# Patient Record
Sex: MALE | Race: WHITE | NOT HISPANIC OR LATINO | URBAN - METROPOLITAN AREA
[De-identification: names, ages, dates, MRNs, and addresses within clinical notes are randomized per-mention and may not be internally consistent; named-entity substitution may affect disease eponyms.]

---

## 2020-09-22 ENCOUNTER — INPATIENT (INPATIENT)
Facility: HOSPITAL | Age: 60
LOS: 0 days | Discharge: HOME | DRG: 309 | End: 2020-09-23
Attending: FAMILY MEDICINE | Admitting: STUDENT IN AN ORGANIZED HEALTH CARE EDUCATION/TRAINING PROGRAM
Payer: COMMERCIAL

## 2020-09-22 VITALS
TEMPERATURE: 99 F | WEIGHT: 197.09 LBS | SYSTOLIC BLOOD PRESSURE: 111 MMHG | HEIGHT: 70 IN | RESPIRATION RATE: 16 BRPM | HEART RATE: 84 BPM | OXYGEN SATURATION: 100 % | DIASTOLIC BLOOD PRESSURE: 61 MMHG

## 2020-09-22 DIAGNOSIS — Z29.9 ENCOUNTER FOR PROPHYLACTIC MEASURES, UNSPECIFIED: ICD-10-CM

## 2020-09-22 DIAGNOSIS — Z98.89 OTHER SPECIFIED POSTPROCEDURAL STATES: Chronic | ICD-10-CM

## 2020-09-22 DIAGNOSIS — N17.9 ACUTE KIDNEY FAILURE, UNSPECIFIED: ICD-10-CM

## 2020-09-22 DIAGNOSIS — I48.91 UNSPECIFIED ATRIAL FIBRILLATION: ICD-10-CM

## 2020-09-22 DIAGNOSIS — I10 ESSENTIAL (PRIMARY) HYPERTENSION: ICD-10-CM

## 2020-09-22 DIAGNOSIS — Z98.890 OTHER SPECIFIED POSTPROCEDURAL STATES: Chronic | ICD-10-CM

## 2020-09-22 LAB
ALBUMIN SERPL ELPH-MCNC: 3.8 G/DL — SIGNIFICANT CHANGE UP (ref 3.3–5)
ALP SERPL-CCNC: 61 U/L — SIGNIFICANT CHANGE UP (ref 40–120)
ALT FLD-CCNC: 39 U/L — SIGNIFICANT CHANGE UP (ref 12–78)
ANION GAP SERPL CALC-SCNC: 9 MMOL/L — SIGNIFICANT CHANGE UP (ref 5–17)
APTT BLD: 28.3 SEC — SIGNIFICANT CHANGE UP (ref 27.5–35.5)
AST SERPL-CCNC: 27 U/L — SIGNIFICANT CHANGE UP (ref 15–37)
BASOPHILS # BLD AUTO: 0.06 K/UL — SIGNIFICANT CHANGE UP (ref 0–0.2)
BASOPHILS NFR BLD AUTO: 0.5 % — SIGNIFICANT CHANGE UP (ref 0–2)
BILIRUB SERPL-MCNC: 0.5 MG/DL — SIGNIFICANT CHANGE UP (ref 0.2–1.2)
BUN SERPL-MCNC: 26 MG/DL — HIGH (ref 7–23)
CALCIUM SERPL-MCNC: 8.6 MG/DL — SIGNIFICANT CHANGE UP (ref 8.5–10.1)
CHLORIDE SERPL-SCNC: 109 MMOL/L — HIGH (ref 96–108)
CK MB BLD-MCNC: 1.4 % — SIGNIFICANT CHANGE UP (ref 0–3.5)
CK MB CFR SERPL CALC: 3.9 NG/ML — HIGH (ref 0–3.6)
CK SERPL-CCNC: 288 U/L — SIGNIFICANT CHANGE UP (ref 26–308)
CO2 SERPL-SCNC: 22 MMOL/L — SIGNIFICANT CHANGE UP (ref 22–31)
CREAT SERPL-MCNC: 1.4 MG/DL — HIGH (ref 0.5–1.3)
EOSINOPHIL # BLD AUTO: 0.18 K/UL — SIGNIFICANT CHANGE UP (ref 0–0.5)
EOSINOPHIL NFR BLD AUTO: 1.4 % — SIGNIFICANT CHANGE UP (ref 0–6)
GLUCOSE SERPL-MCNC: 118 MG/DL — HIGH (ref 70–99)
HCT VFR BLD CALC: 42.4 % — SIGNIFICANT CHANGE UP (ref 39–50)
HGB BLD-MCNC: 15.6 G/DL — SIGNIFICANT CHANGE UP (ref 13–17)
IMM GRANULOCYTES NFR BLD AUTO: 0.6 % — SIGNIFICANT CHANGE UP (ref 0–1.5)
INR BLD: 1 RATIO — SIGNIFICANT CHANGE UP (ref 0.88–1.16)
LYMPHOCYTES # BLD AUTO: 1.9 K/UL — SIGNIFICANT CHANGE UP (ref 1–3.3)
LYMPHOCYTES # BLD AUTO: 14.7 % — SIGNIFICANT CHANGE UP (ref 13–44)
MCHC RBC-ENTMCNC: 33.3 PG — SIGNIFICANT CHANGE UP (ref 27–34)
MCHC RBC-ENTMCNC: 36.8 GM/DL — HIGH (ref 32–36)
MCV RBC AUTO: 90.6 FL — SIGNIFICANT CHANGE UP (ref 80–100)
MONOCYTES # BLD AUTO: 0.79 K/UL — SIGNIFICANT CHANGE UP (ref 0–0.9)
MONOCYTES NFR BLD AUTO: 6.1 % — SIGNIFICANT CHANGE UP (ref 2–14)
NEUTROPHILS # BLD AUTO: 9.92 K/UL — HIGH (ref 1.8–7.4)
NEUTROPHILS NFR BLD AUTO: 76.7 % — SIGNIFICANT CHANGE UP (ref 43–77)
NRBC # BLD: 0 /100 WBCS — SIGNIFICANT CHANGE UP (ref 0–0)
NT-PROBNP SERPL-SCNC: 41 PG/ML — SIGNIFICANT CHANGE UP (ref 0–125)
PLATELET # BLD AUTO: 193 K/UL — SIGNIFICANT CHANGE UP (ref 150–400)
POTASSIUM SERPL-MCNC: 4.1 MMOL/L — SIGNIFICANT CHANGE UP (ref 3.5–5.3)
POTASSIUM SERPL-SCNC: 4.1 MMOL/L — SIGNIFICANT CHANGE UP (ref 3.5–5.3)
PROT SERPL-MCNC: 7.1 G/DL — SIGNIFICANT CHANGE UP (ref 6–8.3)
PROTHROM AB SERPL-ACNC: 11.7 SEC — SIGNIFICANT CHANGE UP (ref 10.6–13.6)
RBC # BLD: 4.68 M/UL — SIGNIFICANT CHANGE UP (ref 4.2–5.8)
RBC # FLD: 12.2 % — SIGNIFICANT CHANGE UP (ref 10.3–14.5)
SODIUM SERPL-SCNC: 140 MMOL/L — SIGNIFICANT CHANGE UP (ref 135–145)
TROPONIN I SERPL-MCNC: <.015 NG/ML — SIGNIFICANT CHANGE UP (ref 0.01–0.04)
TSH SERPL-MCNC: 1.57 UIU/ML — SIGNIFICANT CHANGE UP (ref 0.36–3.74)
WBC # BLD: 12.93 K/UL — HIGH (ref 3.8–10.5)
WBC # FLD AUTO: 12.93 K/UL — HIGH (ref 3.8–10.5)

## 2020-09-22 PROCEDURE — 99291 CRITICAL CARE FIRST HOUR: CPT

## 2020-09-22 PROCEDURE — 99223 1ST HOSP IP/OBS HIGH 75: CPT | Mod: AI

## 2020-09-22 PROCEDURE — 93010 ELECTROCARDIOGRAM REPORT: CPT | Mod: 76

## 2020-09-22 PROCEDURE — 71045 X-RAY EXAM CHEST 1 VIEW: CPT | Mod: 26

## 2020-09-22 PROCEDURE — 93306 TTE W/DOPPLER COMPLETE: CPT | Mod: 26

## 2020-09-22 RX ORDER — LISINOPRIL 2.5 MG/1
1 TABLET ORAL
Qty: 0 | Refills: 0 | DISCHARGE

## 2020-09-22 RX ORDER — SODIUM CHLORIDE 9 MG/ML
1000 INJECTION INTRAMUSCULAR; INTRAVENOUS; SUBCUTANEOUS
Refills: 0 | Status: DISCONTINUED | OUTPATIENT
Start: 2020-09-22 | End: 2020-09-23

## 2020-09-22 RX ORDER — DILTIAZEM HCL 120 MG
10 CAPSULE, EXT RELEASE 24 HR ORAL ONCE
Refills: 0 | Status: COMPLETED | OUTPATIENT
Start: 2020-09-22 | End: 2020-09-22

## 2020-09-22 RX ORDER — DILTIAZEM HCL 120 MG
30 CAPSULE, EXT RELEASE 24 HR ORAL EVERY 6 HOURS
Refills: 0 | Status: DISCONTINUED | OUTPATIENT
Start: 2020-09-22 | End: 2020-09-23

## 2020-09-22 RX ORDER — DILTIAZEM HCL 120 MG
20 CAPSULE, EXT RELEASE 24 HR ORAL ONCE
Refills: 0 | Status: COMPLETED | OUTPATIENT
Start: 2020-09-22 | End: 2020-09-22

## 2020-09-22 RX ORDER — SODIUM CHLORIDE 9 MG/ML
1000 INJECTION INTRAMUSCULAR; INTRAVENOUS; SUBCUTANEOUS ONCE
Refills: 0 | Status: COMPLETED | OUTPATIENT
Start: 2020-09-22 | End: 2020-09-22

## 2020-09-22 RX ORDER — METOPROLOL TARTRATE 50 MG
5 TABLET ORAL ONCE
Refills: 0 | Status: COMPLETED | OUTPATIENT
Start: 2020-09-22 | End: 2020-09-22

## 2020-09-22 RX ORDER — DILTIAZEM HCL 120 MG
30 CAPSULE, EXT RELEASE 24 HR ORAL ONCE
Refills: 0 | Status: COMPLETED | OUTPATIENT
Start: 2020-09-22 | End: 2020-09-22

## 2020-09-22 RX ORDER — ENOXAPARIN SODIUM 100 MG/ML
90 INJECTION SUBCUTANEOUS
Refills: 0 | Status: DISCONTINUED | OUTPATIENT
Start: 2020-09-22 | End: 2020-09-23

## 2020-09-22 RX ADMIN — Medication 5 MILLIGRAM(S): at 21:40

## 2020-09-22 RX ADMIN — SODIUM CHLORIDE 1000 MILLILITER(S): 9 INJECTION INTRAMUSCULAR; INTRAVENOUS; SUBCUTANEOUS at 12:07

## 2020-09-22 RX ADMIN — Medication 10 MILLIGRAM(S): at 21:45

## 2020-09-22 RX ADMIN — Medication 20 MILLIGRAM(S): at 21:20

## 2020-09-22 RX ADMIN — Medication 30 MILLIGRAM(S): at 22:14

## 2020-09-22 NOTE — H&P ADULT - NSHPSOCIALHISTORY_GEN_ALL_CORE
from home  works in sales  drinks 3 drinks daily friday-sunday. No etoh M-Th. Last drink Sunday  Rare tobacco as teenager , no active smoking  Rare Marijuana usage - approx 6x a year   Denies supplement use   Drinks 2-3 cups of coffee a day  [facility] independent

## 2020-09-22 NOTE — ED PROVIDER NOTE - PROGRESS NOTE DETAILS
Paged placed out to cardiology for consult Spoke to hospitalist, Dr. Diop, accepted admission. Pt with new onset afib with RVR, given cardizem 20 IV upon arrival since hr in 170s, no improvement, given another 10 IV cardizem, improved to 120s, then given cardizem 30 po and lopressor 5mg IV, HR improved to 90s. will monitor.  Paged placed out to cardiology for consult

## 2020-09-22 NOTE — ED PROVIDER NOTE - ATTENDING CONTRIBUTION TO CARE
60yo M ho htn on lisinopirl pw palpitations, lightheadedness, dizziness, near syncope and chest pain. pt was playing baseball and suddenly started ot have this sensation. on arrival pt in rapid afib to 170s-180s, with normal BP  pt given 20IV  cardizme without effect, another 10 given, HR came down to 120s but still rapid, pt then given 30mg cardizem PO and 5 IV lorpressor with improvement to the 90s,   will check labs, trop, tsh, cards consult  chads vasc 1

## 2020-09-22 NOTE — H&P ADULT - NSICDXPASTMEDICALHX_GEN_ALL_CORE_FT
PAST MEDICAL HISTORY:  Heartburn occasional    Herpes simplex infection     Hypertension     Inguinal hernia

## 2020-09-22 NOTE — H&P ADULT - PROBLEM SELECTOR PLAN 3
- likely 2/2 hypovolemia , hypovolemia also likely contributed to new onset afib.   - s/p 1L NS in ED, c/w 100cc/hr x 12 hours  - repeat am bmp  - hold lisinopril  - avoid nephrotoxic meds

## 2020-09-22 NOTE — ED PROVIDER NOTE - CLINICAL SUMMARY MEDICAL DECISION MAKING FREE TEXT BOX
60 yo M with hx of HTN while playing baseball tonight around 7:30pm developed sudden onset palpitations, dizziness, near syncopal with chest discomfort, upon arrival in ED, hr 170s, /75, ekg shows afib with rvr, pt with new onset afib, no sob/abd pain/n/v/ha/smoking, will give cardizem IV, IVF, cardiac enzymes, labs, monitor, cardiology consult, admit

## 2020-09-22 NOTE — ED PROVIDER NOTE - MUSCULOSKELETAL, MLM
Spine appears normal, range of motion is not limited, no muscle or joint tenderness, calf NT B, no edema BLE

## 2020-09-22 NOTE — H&P ADULT - HISTORY OF PRESENT ILLNESS
58 yo m pmh htn presents with palpitations and lightheadedness found to be in new onset afib. Pt states he was in his normal states of health, then this afternoon he was playing in a softball game- he was pitching and suddenly developed dizziness like the room was spining around him and felt his heart racing with some associated chest tightness. Rosa was present at game and noted he became diaphoretic and short of breath. He left the game and [facility] to check his HR and blood pressure. BP was 90/60 and HR was jumping all over on the monitor but remained tachycardic. They were concerned how long ems would take so they drove directly to the hospital. At present pt denies cp, palpitations, shortness of breath, dizziness, nausea, vomiting .   In the ED pt found to be in rapid afib. CBC, CMP, TSH, coags, cardiac enzymes performed. Significant for wbc 12.93, cr 1.4, bun  26. TSH WNL.  Pt was given 1 L NS, cardizem 20 mg IVP x 1, 10mg IVP x 1, Cardizem 30mg po x 1, Lopressor 5mg IVP x 1. Pt currently with -120bpm.

## 2020-09-22 NOTE — ED ADULT NURSE NOTE - OBJECTIVE STATEMENT
received pt via triage with c/o diziness while playing softball pt with chest pain evaluated and ekg done and reviewed and placed on cardiac monitor hr 174 to 180 MD at bedside and pt medicated with cardizem 20 mg ivp with no result  and pt medicated withanothe cardizem 10 mg ivp  and  but decrease in pain with scale 4 and pt medicated with cardizem 10 mg po  and metoprolol 5mg ivp with good result of HR of 81 to 90 pt denies any pain at present repeat EKG done and reviewed vital signs stable and xray done pt remained on cardiac monoitor for further evaluation

## 2020-09-22 NOTE — H&P ADULT - NSICDXFAMILYHX_GEN_ALL_CORE_FT
FAMILY HISTORY:  FH: myocardial infarction, Father- age 80's    Father  Still living? Yes, Estimated age: 81-90  Family history of melanoma, Age at diagnosis: Age Unknown    Mother  Still living? Yes, Estimated age: Age Unknown  Family history of breast cancer, Age at diagnosis: Age Unknown

## 2020-09-22 NOTE — H&P ADULT - PROBLEM SELECTOR PLAN 1
-Pt presenting with new onset Afib with RVR, HR initially 150's  - now s/p cardizem IVP 20 x1, 10 x 1, cardizem 30mg po x 1 and lopressor 5mg IVP x 1. improved.  - c/w cardizem 30mg PO q 6 hours. BP borderline. Hold for sbp <100.   - TSH obtained in ED. WNL  - troponin neg x 1. f/u repeat cardiac enzymes  - Obtain echo   - am hemoglobin a1c to further risk stratify for long term A/c   - chadvasc 1 , start full dose lovenox. to discuss risks vs benefits of long term a/c with cardio  - am cbc, bmp, mg, phos  - consult cardio- [facility]  - monitor on telemetry

## 2020-09-22 NOTE — ED ADULT TRIAGE NOTE - CHIEF COMPLAINT QUOTE
"I was playing softball and I started having chest pain."  pt states he was dizzy and pre-syncopal and felt "like my heart was beating out of my chest."

## 2020-09-22 NOTE — H&P ADULT - NSHPPHYSICALEXAM_GEN_ALL_CORE
Vital Signs Last 24 Hrs  T(C): 36.7 (22 Sep 2020 22:30), Max: 37.2 (22 Sep 2020 21:10)  T(F): 98 (22 Sep 2020 22:30), Max: 98.9 (22 Sep 2020 21:10)  HR: 110 (22 Sep 2020 22:30) (84 - 174)  BP: 98/56 (22 Sep 2020 22:30) (95/58 - 127/75)  BP(mean): --  RR: 16 (22 Sep 2020 22:30) (16 - 16)  SpO2: 98% (22 Sep 2020 22:30) (98% - 100%)    General: Well developed, well nourished, NAD  HEENT: NCAT, PERRLA, EOMI bl, moist mucous membranes   Neck: Supple, nontender, no mass  Neurology: A&Ox3, nonfocal  Respiratory: CTA B/L, No W/R/R  CV: tachycardic, +S1/S2, no murmurs, rubs or gallops  Abdominal: Soft, NT, ND +BSx4  Extremities: No C/C/E, + peripheral pulses  Skin: warm, dry, normal color, no rash

## 2020-09-22 NOTE — H&P ADULT - NSHPLABSRESULTS_GEN_ALL_CORE
15.6   12.93 )-----------( 193      ( 22 Sep 2020 21:40 )             42.4     22 Sep 2020 21:40    140    |  109    |  26     ----------------------------<  118    4.1     |  22     |  1.40     Ca    8.6        22 Sep 2020 21:40    TPro  7.1    /  Alb  3.8    /  TBili  0.5    /  DBili  x      /  AST  27     /  ALT  39     /  AlkPhos  61     22 Sep 2020 21:40    LIVER FUNCTIONS - ( 22 Sep 2020 21:40 )  Alb: 3.8 g/dL / Pro: 7.1 g/dL / ALK PHOS: 61 U/L / ALT: 39 U/L / AST: 27 U/L / GGT: x           PT/INR - ( 22 Sep 2020 21:44 )   PT: 11.7 sec;   INR: 1.00 ratio         PTT - ( 22 Sep 2020 21:44 )  PTT:28.3 sec  CAPILLARY BLOOD GLUCOSE        CARDIAC MARKERS ( 22 Sep 2020 21:40 )  <.015 ng/mL / x     / 288 U/L / x     / 3.9 ng/mL

## 2020-09-22 NOTE — H&P ADULT - NSHPREVIEWOFSYSTEMS_GEN_ALL_CORE
Constitutional: denies fever, chills, general malaise  HEENT: denies dry mouth, sore throat, blurry vision, admits dizziness  Respiratory: denies SOB, ROBLES, cough, sputum production, wheezing (fiance noted dyspnea at time of event)  Cardiovascular: denies CP,  edema, admits palpitations - resolved   Gastrointestinal: denies nausea, vomiting, diarrhea  Genitourinary: denies dysuria, frequency, urgency  Skin/Breast: denies rash  Musculoskeletal: denies myalgias, arthritis, joint swelling, muscle weakness  Neurologic: denies syncope, LOC, headache, weakness, admits dizziness  ROS negative except as noted above

## 2020-09-22 NOTE — ED PROVIDER NOTE - OBJECTIVE STATEMENT
58 y/o M with hx of HTN, heartburn presents with c/o palpitations, dizziness, chest discomfort and near syncope today. Pt states that he was playing baseball when he suddenly started to feel these symptoms around 7:30pm tonight and then came to ED for evaluation. Denies fever, cough, headache, vision changes, numbness, tingling, syncope, abdominal pain, N/V, SOB, smoking. Does not have a cardiologist.   pcp: Dr. Dash Tse

## 2020-09-23 ENCOUNTER — TRANSCRIPTION ENCOUNTER (OUTPATIENT)
Age: 60
End: 2020-09-23

## 2020-09-23 VITALS
HEART RATE: 67 BPM | RESPIRATION RATE: 18 BRPM | TEMPERATURE: 98 F | SYSTOLIC BLOOD PRESSURE: 110 MMHG | OXYGEN SATURATION: 95 % | DIASTOLIC BLOOD PRESSURE: 72 MMHG

## 2020-09-23 LAB
A1C WITH ESTIMATED AVERAGE GLUCOSE RESULT: 5 % — SIGNIFICANT CHANGE UP (ref 4–5.6)
ANION GAP SERPL CALC-SCNC: 7 MMOL/L — SIGNIFICANT CHANGE UP (ref 5–17)
BUN SERPL-MCNC: 19 MG/DL — SIGNIFICANT CHANGE UP (ref 7–23)
CALCIUM SERPL-MCNC: 8.1 MG/DL — LOW (ref 8.5–10.1)
CHLORIDE SERPL-SCNC: 114 MMOL/L — HIGH (ref 96–108)
CK MB BLD-MCNC: 1.3 % — SIGNIFICANT CHANGE UP (ref 0–3.5)
CK MB CFR SERPL CALC: 3.6 NG/ML — SIGNIFICANT CHANGE UP (ref 0–3.6)
CK SERPL-CCNC: 276 U/L — SIGNIFICANT CHANGE UP (ref 26–308)
CO2 SERPL-SCNC: 23 MMOL/L — SIGNIFICANT CHANGE UP (ref 22–31)
CREAT SERPL-MCNC: 0.99 MG/DL — SIGNIFICANT CHANGE UP (ref 0.5–1.3)
ESTIMATED AVERAGE GLUCOSE: 97 MG/DL — SIGNIFICANT CHANGE UP (ref 68–114)
GLUCOSE SERPL-MCNC: 94 MG/DL — SIGNIFICANT CHANGE UP (ref 70–99)
HCT VFR BLD CALC: 42.1 % — SIGNIFICANT CHANGE UP (ref 39–50)
HCV AB S/CO SERPL IA: 0.42 S/CO — SIGNIFICANT CHANGE UP (ref 0–0.99)
HCV AB SERPL-IMP: SIGNIFICANT CHANGE UP
HGB BLD-MCNC: 14.9 G/DL — SIGNIFICANT CHANGE UP (ref 13–17)
MAGNESIUM SERPL-MCNC: 2.3 MG/DL — SIGNIFICANT CHANGE UP (ref 1.6–2.6)
MCHC RBC-ENTMCNC: 32.8 PG — SIGNIFICANT CHANGE UP (ref 27–34)
MCHC RBC-ENTMCNC: 35.4 GM/DL — SIGNIFICANT CHANGE UP (ref 32–36)
MCV RBC AUTO: 92.7 FL — SIGNIFICANT CHANGE UP (ref 80–100)
NRBC # BLD: 0 /100 WBCS — SIGNIFICANT CHANGE UP (ref 0–0)
PHOSPHATE SERPL-MCNC: 2.6 MG/DL — SIGNIFICANT CHANGE UP (ref 2.5–4.5)
PLATELET # BLD AUTO: 174 K/UL — SIGNIFICANT CHANGE UP (ref 150–400)
POTASSIUM SERPL-MCNC: 3.9 MMOL/L — SIGNIFICANT CHANGE UP (ref 3.5–5.3)
POTASSIUM SERPL-SCNC: 3.9 MMOL/L — SIGNIFICANT CHANGE UP (ref 3.5–5.3)
RBC # BLD: 4.54 M/UL — SIGNIFICANT CHANGE UP (ref 4.2–5.8)
RBC # FLD: 12.1 % — SIGNIFICANT CHANGE UP (ref 10.3–14.5)
SARS-COV-2 IGG SERPL QL IA: POSITIVE
SARS-COV-2 IGM SERPL IA-ACNC: 155 INDEX — HIGH
SARS-COV-2 RNA SPEC QL NAA+PROBE: SIGNIFICANT CHANGE UP
SODIUM SERPL-SCNC: 144 MMOL/L — SIGNIFICANT CHANGE UP (ref 135–145)
TROPONIN I SERPL-MCNC: <.015 NG/ML — SIGNIFICANT CHANGE UP (ref 0.01–0.04)
WBC # BLD: 8.26 K/UL — SIGNIFICANT CHANGE UP (ref 3.8–10.5)
WBC # FLD AUTO: 8.26 K/UL — SIGNIFICANT CHANGE UP (ref 3.8–10.5)

## 2020-09-23 PROCEDURE — 85610 PROTHROMBIN TIME: CPT

## 2020-09-23 PROCEDURE — 85730 THROMBOPLASTIN TIME PARTIAL: CPT

## 2020-09-23 PROCEDURE — 36415 COLL VENOUS BLD VENIPUNCTURE: CPT

## 2020-09-23 PROCEDURE — 71045 X-RAY EXAM CHEST 1 VIEW: CPT

## 2020-09-23 PROCEDURE — 83036 HEMOGLOBIN GLYCOSYLATED A1C: CPT

## 2020-09-23 PROCEDURE — 80048 BASIC METABOLIC PNL TOTAL CA: CPT

## 2020-09-23 PROCEDURE — 86769 SARS-COV-2 COVID-19 ANTIBODY: CPT

## 2020-09-23 PROCEDURE — 93010 ELECTROCARDIOGRAM REPORT: CPT

## 2020-09-23 PROCEDURE — 85027 COMPLETE CBC AUTOMATED: CPT

## 2020-09-23 PROCEDURE — 99223 1ST HOSP IP/OBS HIGH 75: CPT

## 2020-09-23 PROCEDURE — 84100 ASSAY OF PHOSPHORUS: CPT

## 2020-09-23 PROCEDURE — U0003: CPT

## 2020-09-23 PROCEDURE — 84484 ASSAY OF TROPONIN QUANT: CPT

## 2020-09-23 PROCEDURE — 99291 CRITICAL CARE FIRST HOUR: CPT

## 2020-09-23 PROCEDURE — 86803 HEPATITIS C AB TEST: CPT

## 2020-09-23 PROCEDURE — 96374 THER/PROPH/DIAG INJ IV PUSH: CPT

## 2020-09-23 PROCEDURE — 85025 COMPLETE CBC W/AUTO DIFF WBC: CPT

## 2020-09-23 PROCEDURE — 93306 TTE W/DOPPLER COMPLETE: CPT

## 2020-09-23 PROCEDURE — 83735 ASSAY OF MAGNESIUM: CPT

## 2020-09-23 PROCEDURE — 93005 ELECTROCARDIOGRAM TRACING: CPT

## 2020-09-23 PROCEDURE — 80053 COMPREHEN METABOLIC PANEL: CPT

## 2020-09-23 PROCEDURE — 96375 TX/PRO/DX INJ NEW DRUG ADDON: CPT

## 2020-09-23 PROCEDURE — 99239 HOSP IP/OBS DSCHRG MGMT >30: CPT | Mod: GC

## 2020-09-23 PROCEDURE — 83880 ASSAY OF NATRIURETIC PEPTIDE: CPT

## 2020-09-23 PROCEDURE — 84443 ASSAY THYROID STIM HORMONE: CPT

## 2020-09-23 PROCEDURE — 82550 ASSAY OF CK (CPK): CPT

## 2020-09-23 PROCEDURE — 82553 CREATINE MB FRACTION: CPT

## 2020-09-23 RX ORDER — INFLUENZA VIRUS VACCINE 15; 15; 15; 15 UG/.5ML; UG/.5ML; UG/.5ML; UG/.5ML
0.5 SUSPENSION INTRAMUSCULAR ONCE
Refills: 0 | Status: DISCONTINUED | OUTPATIENT
Start: 2020-09-23 | End: 2020-09-23

## 2020-09-23 RX ORDER — METOPROLOL TARTRATE 50 MG
1 TABLET ORAL
Qty: 30 | Refills: 0
Start: 2020-09-23 | End: 2020-10-22

## 2020-09-23 RX ORDER — APIXABAN 2.5 MG/1
1 TABLET, FILM COATED ORAL
Qty: 60 | Refills: 0
Start: 2020-09-23 | End: 2020-10-22

## 2020-09-23 RX ORDER — IBUTILIDE FUMARATE 0.1 MG/ML
1 INJECTION, SOLUTION INTRAVENOUS ONCE
Refills: 0 | Status: COMPLETED | OUTPATIENT
Start: 2020-09-23 | End: 2020-09-23

## 2020-09-23 RX ADMIN — SODIUM CHLORIDE 100 MILLILITER(S): 9 INJECTION INTRAMUSCULAR; INTRAVENOUS; SUBCUTANEOUS at 00:40

## 2020-09-23 RX ADMIN — Medication 30 MILLIGRAM(S): at 00:42

## 2020-09-23 RX ADMIN — ENOXAPARIN SODIUM 90 MILLIGRAM(S): 100 INJECTION SUBCUTANEOUS at 05:36

## 2020-09-23 RX ADMIN — Medication 30 MILLIGRAM(S): at 05:36

## 2020-09-23 RX ADMIN — IBUTILIDE FUMARATE 360 MILLIGRAM(S): 0.1 INJECTION, SOLUTION INTRAVENOUS at 10:06

## 2020-09-23 RX ADMIN — SODIUM CHLORIDE 1000 MILLILITER(S): 9 INJECTION INTRAMUSCULAR; INTRAVENOUS; SUBCUTANEOUS at 02:17

## 2020-09-23 RX ADMIN — IBUTILIDE FUMARATE 720 MILLIGRAM(S): 0.1 INJECTION, SOLUTION INTRAVENOUS at 09:20

## 2020-09-23 NOTE — PROGRESS NOTE ADULT - PROBLEM SELECTOR PLAN 1
Pt presenting with new onset Afib with RVR, HR initially 150's  - s/p cardizem IVP 20 x1, 10 x 1, cardizem 30mg po x 1 and lopressor 5mg IVP x 1 in ED  - TSH obtained in ED. WNL  - No signs of acute ischemia, trops neg x2  - now in sinus rhythm, bradycardic at 56BPM s/p IV ibutilide  - HOLD cardizem dose as pt is bradycardic. Will likely send home with toprol 25 mg qd as per cardio recs    - Chadvasc score 1, continue full dose lovenox during hospitalization  - Patient will continue AC with Eliquis for 30 days and follow up with Dr. Strickland in the office  - Cardio, Dr. Lynn on board

## 2020-09-23 NOTE — PROGRESS NOTE ADULT - SUBJECTIVE AND OBJECTIVE BOX
Patient is a 60y old  Male who presents with a chief complaint of Afib (23 Sep 2020 07:54)      INTERVAL HPI/OVERNIGHT EVENTS: Patient seen and examined at bedside. Overnight patient in Afib rate 130's-150's. HR 99 this AM. Patient states he feels improved from yesterday. Patient denies CP, weakness, dizziness, SOB, or n/v.       MEDICATIONS  (STANDING):  diltiazem    Tablet 30 milliGRAM(s) Oral every 6 hours  enoxaparin Injectable 90 milliGRAM(s) SubCutaneous two times a day  influenza   Vaccine 0.5 milliLiter(s) IntraMuscular once    MEDICATIONS  (PRN):      Allergies    No Known Allergies    Intolerances        REVIEW OF SYSTEMS:  CONSTITUTIONAL: No fever or chills  HEENT:  No headache, no sore throat  RESPIRATORY: No cough, wheezing, or shortness of breath  CARDIOVASCULAR: No chest pain, positive for palpitations  GASTROINTESTINAL: No abd pain, nausea, vomiting, or diarrhea  GENITOURINARY: No dysuria, frequency, or hematuria  NEUROLOGICAL: no focal weakness or dizziness  MUSCULOSKELETAL: no myalgias     Vital Signs Last 24 Hrs  T(C): 36.6 (23 Sep 2020 09:23), Max: 37.2 (22 Sep 2020 21:10)  T(F): 97.9 (23 Sep 2020 09:23), Max: 98.9 (22 Sep 2020 21:10)  HR: 111 (23 Sep 2020 09:23) (73 - 174)  BP: 120/73 (23 Sep 2020 09:23) (95/58 - 127/75)  BP(mean): --  RR: 18 (23 Sep 2020 09:23) (15 - 18)  SpO2: 94% (23 Sep 2020 09:23) (92% - 100%)    PHYSICAL EXAM:  GENERAL: NAD, laying in bed comfortably  HEENT:  anicteric, moist mucous membranes  CHEST/LUNG:  CTA b/l, no rales, wheezes, or rhonchi  HEART:  Positive for tachycardia, irregular rhythm   ABDOMEN:  BS+, soft, nontender, nondistended  EXTREMITIES: no edema, cyanosis, or calf tenderness  NERVOUS SYSTEM: answers questions and follows commands appropriately    LABS:                        14.9   8.26  )-----------( 174      ( 23 Sep 2020 06:54 )             42.1     CBC Full  -  ( 23 Sep 2020 06:54 )  WBC Count : 8.26 K/uL  Hemoglobin : 14.9 g/dL  Hematocrit : 42.1 %  Platelet Count - Automated : 174 K/uL  Mean Cell Volume : 92.7 fl  Mean Cell Hemoglobin : 32.8 pg  Mean Cell Hemoglobin Concentration : 35.4 gm/dL  Auto Neutrophil # : x  Auto Lymphocyte # : x  Auto Monocyte # : x  Auto Eosinophil # : x  Auto Basophil # : x  Auto Neutrophil % : x  Auto Lymphocyte % : x  Auto Monocyte % : x  Auto Eosinophil % : x  Auto Basophil % : x    23 Sep 2020 06:54    144    |  114    |  19     ----------------------------<  94     3.9     |  23     |  0.99     Ca    8.1        23 Sep 2020 06:54  Phos  2.6       23 Sep 2020 06:54  Mg     2.3       23 Sep 2020 06:54    TPro  7.1    /  Alb  3.8    /  TBili  0.5    /  DBili  x      /  AST  27     /  ALT  39     /  AlkPhos  61     22 Sep 2020 21:40    PT/INR - ( 22 Sep 2020 21:44 )   PT: 11.7 sec;   INR: 1.00 ratio         PTT - ( 22 Sep 2020 21:44 )  PTT:28.3 sec    CAPILLARY BLOOD GLUCOSE              RADIOLOGY & ADDITIONAL TESTS:      < from: Xray Chest 1 View-PORTABLE IMMEDIATE (09.22.20 @ 22:13) >  EXAM:  XR CHEST PORTABLE IMMED 1V                            PROCEDURE DATE:  09/22/2020          INTERPRETATION:  Chest pain.    AP chest. Prior 12/8/2013.    Heart magnified by AP film shallow inspiration. No pleural or parenchymal abnormality.    Impression: No active disease.    < end of copied text >        Personally reviewed.     Consultant(s) Notes Reviewed:  [x] YES  [ ] NO

## 2020-09-23 NOTE — CONSULT NOTE ADULT - SUBJECTIVE AND OBJECTIVE BOX
Patient is a 60y old  Male who presents with a chief complaint of Afib (22 Sep 2020 23:16)    Charting in progress    HPI:  59 yo m pmh htn presents with palpitations and lightheadedness found to be in new onset afib. Pt states he was in his normal states of health, then this afternoon he was playing in a softball game- he was pitching and suddenly developed dizziness like the room was spining around him and felt his heart racing with some associated chest tightness. Rosa was present at game and noted he became diaphoretic and short of breath. He left the game and drove to the [facility] chiropractic office to check his HR and blood pressure. BP was 90/60 and HR was jumping all over on the monitor but remained tachycardic. They were concerned how long ems would take so they drove directly to the hospital. At present pt denies cp, palpitations, shortness of breath, dizziness, nausea, vomiting .   In the ED pt found to be in rapid afib. CBC, CMP, TSH, coags, cardiac enzymes performed. Significant for wbc 12.93, cr 1.4, bun  26. TSH WNL.  Pt was given 1 L NS, cardizem 20 mg IVP x 1, 10mg IVP x 1, Cardizem 30mg po x 1, Lopressor 5mg IVP x 1. Pt currently with -120bpm.  (22 Sep 2020 23:16)    Interval history: Tele reports overnight patient in 130's-150's. Currently 113 per tele monitor. Pt says at the moment no longer feels palpations, SOB, dizziness or lightheadedness, chest pain. Denies nausea, vomiting, LE edema. Says he has been under some stress with his ex-wife but "nothing he can't handle". Says he snores at night. Denies history of sleep apnea. Drinks 3 cups of coffee per day. Says he has some lower back pain which he takes NSAIDs for as needed. Took advil before his softball game last night.     PAST MEDICAL & SURGICAL HISTORY:  Hypertension    Heartburn  occasional    Herpes simplex infection    Inguinal hernia    H/O hernia repair    H/O arthroscopy of right knee  2014          ECHO  FINDINGS: Ordered      MEDICATIONS  (STANDING):  diltiazem    Tablet 30 milliGRAM(s) Oral every 6 hours  enoxaparin Injectable 90 milliGRAM(s) SubCutaneous two times a day  influenza   Vaccine 0.5 milliLiter(s) IntraMuscular once    MEDICATIONS  (PRN):      FAMILY HISTORY:  FH: myocardial infarction  Father- age 80&#x27;s    Family history of melanoma (Father)    Family history of breast cancer (Mother)      Denies Family history of CAD or early MI      Constitutional: denies fever, chills  Respiratory: denies SOB, ROBLES, cough  Cardiovascular: denies CP, palpitations, orthopnea, PND, LE edema  Gastrointestinal: denies nausea, vomiting, abdominal pain  Skin: Denies rashes, itching  Neurologic: denies weakness, dizziness  Hematology/Oncology: denies bleeding, easy bruising  ROS negative except as noted above      SOCIAL HISTORY:  drinks 3 drinks daily on the weekends   smoked as teenager , no active smoking  Rare Marijuana usage - approx 6x a year     Vital Signs Last 24 Hrs  T(C): 36.7 (23 Sep 2020 07:26), Max: 37.2 (22 Sep 2020 21:10)  T(F): 98 (23 Sep 2020 07:26), Max: 98.9 (22 Sep 2020 21:10)  HR: 99 (23 Sep 2020 07:26) (73 - 174)  BP: 110/68 (23 Sep 2020 07:26) (95/58 - 127/75)  BP(mean): --  RR: 15 (23 Sep 2020 07:26) (15 - 17)  SpO2: 92% (23 Sep 2020 07:26) (92% - 100%)    Physical Exam:  General: Well developed, well nourished, NAD  HEENT: NCAT, EOMI  Neurology: Alert and oriented, able to answer questions appropriately   Respiratory: CTA B/L, No W/R/R  CV: Irregular rhythm,+tachycardic, +S1/S2, no murmurs, rubs or gallops  Abdominal: Soft, NT, ND +BSx4, no palpable masses  Extremities: No C/C/E, + peripheral pulses  Heme: No obvious ecchymosis or petechiae   Skin: warm, dry, normal color      ECG: Afib with RVR    I&O's Detail    22 Sep 2020 07:01  -  23 Sep 2020 07:00  --------------------------------------------------------  IN:    sodium chloride 0.9%: 500 mL  Total IN: 500 mL    OUT:  Total OUT: 0 mL    Total NET: 500 mL          LABS:                        14.9   8.26  )-----------( 174      ( 23 Sep 2020 06:54 )             42.1     09-23    144  |  114<H>  |  19  ----------------------------<  94  3.9   |  23  |  0.99    Ca    8.1<L>      23 Sep 2020 06:54  Phos  2.6     09-23  Mg     2.3     09-23    TPro  7.1  /  Alb  3.8  /  TBili  0.5  /  DBili  x   /  AST  27  /  ALT  39  /  AlkPhos  61  09-22    CARDIAC MARKERS ( 23 Sep 2020 06:54 )  <.015 ng/mL / x     / 276 U/L / x     / 3.6 ng/mL  CARDIAC MARKERS ( 22 Sep 2020 21:40 )  <.015 ng/mL / x     / 288 U/L / x     / 3.9 ng/mL      PT/INR - ( 22 Sep 2020 21:44 )   PT: 11.7 sec;   INR: 1.00 ratio         PTT - ( 22 Sep 2020 21:44 )  PTT:28.3 sec    I&O's Summary    22 Sep 2020 07:01  -  23 Sep 2020 07:00  --------------------------------------------------------  IN: 500 mL / OUT: 0 mL / NET: 500 mL      BNPSerum Pro-Brain Natriuretic Peptide: 41 pg/mL (09-22 @ 21:40)     Patient is a 60y old  Male who presents with a chief complaint of Afib (22 Sep 2020 23:16)      HPI:  59 yo m pmh htn presents with palpitations and lightheadedness found to be in new onset afib. Pt states he was in his normal states of health, then this afternoon he was playing in a softball game- he was pitching and suddenly developed dizziness like the room was spining around him and felt his heart racing with some associated chest tightness. Rosa was present at game and noted he became diaphoretic and short of breath. He left the game and drove to the [facility] chiropractic office to check his HR and blood pressure. BP was 90/60 and HR was jumping all over on the monitor but remained tachycardic. They were concerned how long ems would take so they drove directly to the hospital. At present pt denies cp, palpitations, shortness of breath, dizziness, nausea, vomiting .   In the ED pt found to be in rapid afib. CBC, CMP, TSH, coags, cardiac enzymes performed. Significant for wbc 12.93, cr 1.4, bun  26. TSH WNL.  Pt was given 1 L NS, cardizem 20 mg IVP x 1, 10mg IVP x 1, Cardizem 30mg po x 1, Lopressor 5mg IVP x 1. Pt currently with -120bpm.  (22 Sep 2020 23:16)    Interval history: Tele reports overnight patient in 130's-150's. Currently 113 per tele monitor. Pt says at the moment no longer feels palpations, SOB, dizziness or lightheadedness, chest pain. Denies nausea, vomiting, LE edema. Says he has been under some stress with his ex-wife but "nothing he can't handle". Says he snores at night. Denies history of sleep apnea. Drinks 3 cups of coffee per day. Says he has some lower back pain which he takes NSAIDs for as needed. Took advil before his softball game last night.     PAST MEDICAL & SURGICAL HISTORY:  Hypertension    Heartburn  occasional    Herpes simplex infection    Inguinal hernia    H/O hernia repair    H/O arthroscopy of right knee  2014          ECHO  FINDINGS: Ordered      MEDICATIONS  (STANDING):  diltiazem    Tablet 30 milliGRAM(s) Oral every 6 hours  enoxaparin Injectable 90 milliGRAM(s) SubCutaneous two times a day  influenza   Vaccine 0.5 milliLiter(s) IntraMuscular once    MEDICATIONS  (PRN):      FAMILY HISTORY:  FH: myocardial infarction  Father- age 80&#x27;s    Family history of melanoma (Father)    Family history of breast cancer (Mother)      Denies Family history of CAD or early MI      Constitutional: denies fever, chills  Respiratory: denies SOB, ROBLES, cough  Cardiovascular: denies CP, palpitations, orthopnea, PND, LE edema  Gastrointestinal: denies nausea, vomiting, abdominal pain  Skin: Denies rashes, itching  Neurologic: denies weakness, dizziness  Hematology/Oncology: denies bleeding, easy bruising  ROS negative except as noted above      SOCIAL HISTORY:  drinks 3 drinks daily on the weekends   smoked as teenager , no active smoking  Rare Marijuana usage - approx 6x a year     Vital Signs Last 24 Hrs  T(C): 36.7 (23 Sep 2020 07:26), Max: 37.2 (22 Sep 2020 21:10)  T(F): 98 (23 Sep 2020 07:26), Max: 98.9 (22 Sep 2020 21:10)  HR: 99 (23 Sep 2020 07:26) (73 - 174)  BP: 110/68 (23 Sep 2020 07:26) (95/58 - 127/75)  BP(mean): --  RR: 15 (23 Sep 2020 07:26) (15 - 17)  SpO2: 92% (23 Sep 2020 07:26) (92% - 100%)    Physical Exam:  General: Well developed, well nourished, NAD  HEENT: NCAT, EOMI  Neurology: Alert and oriented, able to answer questions appropriately   Respiratory: CTA B/L, No W/R/R  CV: Irregular rhythm,+tachycardic, +S1/S2, no murmurs, rubs or gallops  Abdominal: Soft, NT, ND +BSx4, no palpable masses  Extremities: No C/C/E, + peripheral pulses  Heme: No obvious ecchymosis or petechiae   Skin: warm, dry, normal color      ECG: Afib with RVR    I&O's Detail    22 Sep 2020 07:01  -  23 Sep 2020 07:00  --------------------------------------------------------  IN:    sodium chloride 0.9%: 500 mL  Total IN: 500 mL    OUT:  Total OUT: 0 mL    Total NET: 500 mL          LABS:                        14.9   8.26  )-----------( 174      ( 23 Sep 2020 06:54 )             42.1     09-23    144  |  114<H>  |  19  ----------------------------<  94  3.9   |  23  |  0.99    Ca    8.1<L>      23 Sep 2020 06:54  Phos  2.6     09-23  Mg     2.3     09-23    TPro  7.1  /  Alb  3.8  /  TBili  0.5  /  DBili  x   /  AST  27  /  ALT  39  /  AlkPhos  61  09-22    CARDIAC MARKERS ( 23 Sep 2020 06:54 )  <.015 ng/mL / x     / 276 U/L / x     / 3.6 ng/mL  CARDIAC MARKERS ( 22 Sep 2020 21:40 )  <.015 ng/mL / x     / 288 U/L / x     / 3.9 ng/mL      PT/INR - ( 22 Sep 2020 21:44 )   PT: 11.7 sec;   INR: 1.00 ratio         PTT - ( 22 Sep 2020 21:44 )  PTT:28.3 sec    I&O's Summary    22 Sep 2020 07:01  -  23 Sep 2020 07:00  --------------------------------------------------------  IN: 500 mL / OUT: 0 mL / NET: 500 mL      BNPSerum Pro-Brain Natriuretic Peptide: 41 pg/mL (09-22 @ 21:40)

## 2020-09-23 NOTE — PROGRESS NOTE ADULT - PROBLEM SELECTOR PLAN 3
- Improved, likely 2/2 hypovolemia   - hypovolemia also likely contributed to new onset afib.   - hold lisinopril  - avoid nephrotoxic meds

## 2020-09-23 NOTE — DISCHARGE NOTE PROVIDER - NSDCFUADDINST_GEN_ALL_CORE_FT
Follow up with Cardiology, Dr. Strickland 1-2 weeks after discharge    Follow up with your primary care doctor 1-2 weeks after discharge Follow up with Cardiology, Dr. Strickland 3 days after discharge    Follow up with your primary care doctor 1-2 weeks after discharge

## 2020-09-23 NOTE — DISCHARGE NOTE PROVIDER - NSDCCPCAREPLAN_GEN_ALL_CORE_FT
PRINCIPAL DISCHARGE DIAGNOSIS  Diagnosis: Atrial fibrillation with RVR  Assessment and Plan of Treatment: You were found to have Atrial fibrilation with a high heart rate when you were seen in the ED. You were given multiple medications to help control your heart rate and convert your heart rhythm back to normal. Your rhythm normalized. You will be discharged on two new medications-- you will take metoprolol succinate ER 25mg once a day, and eliquis 5mg twice a day. You will follow up with cardiology, Dr. Lynn 1 week after discharge      SECONDARY DISCHARGE DIAGNOSES  Diagnosis: Essential hypertension  Assessment and Plan of Treatment: Continue your home lisinopril     PRINCIPAL DISCHARGE DIAGNOSIS  Diagnosis: Atrial fibrillation with RVR  Assessment and Plan of Treatment: You were found to have Atrial fibrilation with a high heart rate when you were seen in the ED. You were given multiple medications to help control your heart rate and convert your heart rhythm back to normal. Your rhythm converted to normal sinus while admitted. You will be discharged on two new medications. Please take metoprolol succinate ER 25mg once a day, and eliquis 5mg twice a day. Please follow up with Dr. Lynn 3 days of discharge. Call Dr. Strickland or return to ED if you develop worsening symptoms or notice your heart rate is persistently > 110.      SECONDARY DISCHARGE DIAGNOSES  Diagnosis: Essential hypertension  Assessment and Plan of Treatment: Continue your home lisinopril. Follow up with Dr. Strickland within 3 days.     PRINCIPAL DISCHARGE DIAGNOSIS  Diagnosis: Atrial fibrillation with RVR  Assessment and Plan of Treatment: You were found to have Atrial fibrilation with a high heart rate when you were seen in the ED. You were given multiple medications to help control your heart rate and convert your heart rhythm back to normal. Your rhythm converted to normal sinus while admitted. You will be discharged on two new medications. Please take metoprolol succinate ER 25mg once a day, and eliquis 5mg twice a day. Please follow up with Dr. Lynn 3 days of discharge. Call Dr. Strickland or return to ED if you develop worsening symptoms or notice your heart rate is persistently > 110. You were counseled on watching your activity as non contact and least risky acitvity should be taken as youll be discharged on a blood thinner, you were also educated on monitor on the signs and symptoms of bleeding. Please follow up with your primary medical provider as well.      SECONDARY DISCHARGE DIAGNOSES  Diagnosis: Essential hypertension  Assessment and Plan of Treatment: Continue your home lisinopril. Follow up with Dr. Strickland within 3 days.  Please have a safe and Happy birthday and follow up with your primary medical provider for ongoig care and management for many more years of stable health!

## 2020-09-23 NOTE — DISCHARGE NOTE PROVIDER - CARE PROVIDER_API CALL
Fazal Strickland)  Internal Medicine  [address] [place] [address]  Phone: [phone]  Fax: [phone]  Follow Up Time:     Dash Tse  INTERNAL MEDICINE  [address], [address]  [place] [address]  Phone: [phone]  Fax: [phone]  Follow Up Time:    Fazal Strickland)  Internal Medicine  [address] [place] [address]  Phone: [phone]  Fax: [phone]  Follow Up Time: 1-3 days    Dash Tse  INTERNAL MEDICINE  [address], [address]  [place] [address]  Phone: [phone]  Fax: [phone]  Established Patient  Follow Up Time: 1-3 days

## 2020-09-23 NOTE — DISCHARGE NOTE NURSING/CASE MANAGEMENT/SOCIAL WORK - PATIENT PORTAL LINK FT
You can access the FollowMyHealth Patient Portal offered by [facility] by registering at the following website: [email or web address]. By joining [facility]’s FollowMyHealth portal, you will also be able to view your health information using other applications (apps) compatible with our system.

## 2020-09-23 NOTE — DISCHARGE NOTE PROVIDER - NSDCMRMEDTOKEN_GEN_ALL_CORE_FT
apixaban 5 mg oral tablet: 1 tab(s) orally 2 times a day   lisinopril 30 mg oral tablet: 1 tab(s) orally once a day  Metoprolol Succinate ER 25 mg oral tablet, extended release: 1 tab(s) orally once a day

## 2020-09-23 NOTE — CONSULT NOTE ADULT - ATTENDING COMMENTS
I personally saw and examined the patient in detail.  I have spoken to the above provider regarding the assessment and plan of care.  I reviewed the above assessment and plan of care, and agree.  I have made changes in the body of the note where appropriate.  Ibutilide for chemical cardioversion.  He converted with ibutilide.  To start full AC with ELiquis.  TO chagne ACEI to TOprol 25 QD on d/c.  To follow with me in the office.

## 2020-09-23 NOTE — DISCHARGE NOTE PROVIDER - PROVIDER TOKENS
PROVIDER:[TOKEN:[[id number]]],PROVIDER:[TOKEN:[[id number]]] PROVIDER:[TOKEN:[[id number]],FOLLOWUP:[1-3 days]],PROVIDER:[TOKEN:[[id number]],FOLLOWUP:[1-3 days],ESTABLISHEDPATIENT:[T]]

## 2020-09-23 NOTE — DISCHARGE NOTE PROVIDER - HOSPITAL COURSE
HPI:  60 yo m pmh htn presents with palpitations and lightheadedness found to be in new onset afib. Pt states he was in his normal states of health, then this afternoon he was playing in a softball game- he was pitching and suddenly developed dizziness like the room was spining around him and felt his heart racing with some associated chest tightness. Rosa was present at game and noted he became diaphoretic and short of breath. He left the game and [facility] to check his HR and blood pressure. BP was 90/60 and HR was jumping all over on the monitor but remained tachycardic. They were concerned how long ems would take so they drove directly to the hospital. At present pt denies cp, palpitations, shortness of breath, dizziness, nausea, vomiting .   In the ED pt found to be in rapid afib. CBC, CMP, TSH, coags, cardiac enzymes performed. Significant for wbc 12.93, cr 1.4, bun  26. TSH WNL.  Pt was given 1 L NS, cardizem 20 mg IVP x 1, 10mg IVP x 1, Cardizem 30mg po x 1, Lopressor 5mg IVP x 1. Pt currently with -120bpm.  (22 Sep 2020 23:16)      ---  HOSPITAL COURSE: Patient was admitted for new onset A fib in RVR. No signs of acute ischemia, trops neg x2. Patient was given multiple doses of cardizem and lopressor in the ED and started on full dose lovenox. Patient was evaluated by Cardiology, Dr. Lynn. Patient was converted to sinus rhythm with ibutilide. Patient will be discharged on toprol 25mg qd and eliquis for 30 days. Patient will follow up w/ Dr. Lynn outpatient. Patient is medically stable for discharge and outpatient follow up.    ---  CONSULTANTS:   Cardiology, Dr. Lynn    ---  TIME SPENT:  I, the attending physician, was physically present for the key portions of the evaluation and management (E/M) service provided. The total amount of time spent reviewing the hospital notes, laboratory values, imaging findings, assessing/counseling the patient, discussing with consultant physicians, social work, nursing staff was -- minutes     HPI:  60 yo m pmh htn presents with palpitations and lightheadedness found to be in new onset afib. Pt states he was in his normal states of health, then this afternoon he was playing in a softball game- he was pitching and suddenly developed dizziness like the room was spining around him and felt his heart racing with some associated chest tightness. Rosa was present at game and noted he became diaphoretic and short of breath. He left the game and [facility] to check his HR and blood pressure. BP was 90/60 and HR was jumping all over on the monitor but remained tachycardic. They were concerned how long ems would take so they drove directly to the hospital. At present pt denies cp, palpitations, shortness of breath, dizziness, nausea, vomiting .   In the ED pt found to be in rapid afib. CBC, CMP, TSH, coags, cardiac enzymes performed. Significant for wbc 12.93, cr 1.4, bun  26. TSH WNL.  Pt was given 1 L NS, cardizem 20 mg IVP x 1, 10mg IVP x 1, Cardizem 30mg po x 1, Lopressor 5mg IVP x 1. Pt currently with -120bpm.  (22 Sep 2020 23:16)      ---  HOSPITAL COURSE: Patient was admitted for new onset A fib in RVR. No signs of acute ischemia, trops neg x2. Patient was given multiple doses of cardizem and lopressor in the ED and started on full dose lovenox. Patient was evaluated by Cardiology, Dr. Lynn. Patient was converted to sinus rhythm with ibutilide. Patient will be discharged on toprol 25mg qd and eliquis for 30 days. Patient will follow up w/ Dr. Lynn outpatient. Patient is medically stable for discharge and outpatient follow up. TSH WNL. A1C 5.0. TTE done while inpatient, patient to follow up with Dr. Strickland within 3 days of discharge for results and further management. Patient provided pulse oximiter at time of discharge, given strict return to care precautions.     Of note, patient found to be positive for COVID Ab, patient notes he had COVID symptoms earlier this year. COVID pcr is negative at time of admission and patient otherwise asymptomatic.      ---  CONSULTANTS:   Cardiology, Dr. Lynn    ---  TIME SPENT:  I, the attending physician, was physically present for the key portions of the evaluation and management (E/M) service provided. The total amount of time spent reviewing the hospital notes, laboratory values, imaging findings, assessing/counseling the patient, discussing with consultant physicians, social work, nursing staff was -- minutes     HPI:  58 yo m pmh htn presents with palpitations and lightheadedness found to be in new onset afib. Pt states he was in his normal states of health, then this afternoon he was playing in a softball game- he was pitching and suddenly developed dizziness like the room was spining around him and felt his heart racing with some associated chest tightness. Rosa was present at game and noted he became diaphoretic and short of breath. He left the game and drove [facility] to check his HR and blood pressure. BP was 90/60 and HR was jumping all over on the monitor but remained tachycardic. They were concerned how long ems would take so they drove directly to the hospital. At present pt denies cp, palpitations, shortness of breath, dizziness, nausea, vomiting .   In the ED pt found to be in rapid afib. CBC, CMP, TSH, coags, cardiac enzymes performed. Significant for wbc 12.93, cr 1.4, bun  26. TSH WNL.  Pt was given 1 L NS, cardizem 20 mg IVP x 1, 10mg IVP x 1, Cardizem 30mg po x 1, Lopressor 5mg IVP x 1. Pt currently with -120bpm.  (22 Sep 2020 23:16)      ---  HOSPITAL COURSE: Patient was admitted for new onset A fib in RVR. No signs of acute ischemia, trops neg x2. Patient was given multiple doses of cardizem and lopressor in the ED and started on full dose lovenox. Patient was evaluated by Cardiology, Dr. Lynn. Patient was converted to sinus rhythm with ibutilide. Patient will be discharged on toprol 25mg qd and eliquis for 30 days. Patient will follow up w/ Dr. Lynn outpatient. Patient is medically stable for discharge and outpatient follow up. TSH WNL. A1C 5.0. TTE done while inpatient, patient to follow up with Dr. Strickland within 3 days of discharge for results and further management. Pt was started on fll dose anticoagulation and counseled extenstively on the risks and benefits of this medication.  Patient provided pulse oximeter with hear rate monitor at time of discharge, given strict return to care precautions.     Of note, patient found to be positive for COVID Ab, patient notes he had COVID symptoms earlier this year. COVID pcr is negative at time of admission and patient otherwise asymptomatic.      ---  CONSULTANTS:   Cardiology, Dr. Lynn    ---  TIME SPENT: 70 minutes

## 2020-09-23 NOTE — CONSULT NOTE ADULT - ASSESSMENT
59 yo M with PMHx of HTN presenting with new onset afib with RVR    #New onset Atrial fibrillation with RVR  - now s/p cardizem IVP 20 x1, 10 x 1, cardizem 30mg po x 1 and lopressor 5mg IVP x 1 in ED, improved to HR of 81 on repeat EKG in ED  - Overnight tele reports HRs in 130's-150's, now currently in low 100's  - Patient is not complaining of any anginal symptoms or palpitations at this time.  - No clear evidence of acute ischemia, trops negative x 2.  - Chadvasc score 1, continue full dose lovenox,   - Continue cardizem   - Echo ordered, f/u results     #HTN  - BP soft 90's-100's s/p lopressor and cardizem  - Continue to hold lisinopril   - Continue to monitor routine hemodynamics    - Pt appears clinically euvolemic  - Monitor and replete lytes, keep K>4, Mg>2.  - Other cardiovascular workup will depend on clinical course.  - All other workup per primary team.  - Will continue to follow.             59 yo M with PMHx of HTN presenting with new onset afib with RVR    #New onset Atrial fibrillation with RVR  - now s/p cardizem IVP 20 x1, 10 x 1, cardizem 30mg po x 1 and lopressor 5mg IVP x 1 in ED, improved to HR of 81 on repeat EKG in ED  - Overnight tele reports HRs in 130's-150's, now currently in low 100's  - Patient is not complaining of any anginal symptoms or palpitations at this time.  - No clear evidence of acute ischemia, trops negative x 2.  - IV ibutilide for conversion, if does not convert after 2 doses consider cardioversion while in echo   - Chadvasc score 1, continue full dose lovenox,  - Continue cardizem     #HTN  - BP soft 90's-100's s/p lopressor and cardizem  - Continue to hold lisinopril   - Continue to monitor routine hemodynamics    - Pt appears clinically euvolemic  - Monitor and replete lytes, keep K>4, Mg>2.  - Other cardiovascular workup will depend on clinical course.  - All other workup per primary team.  - Will continue to follow.             61 yo M with PMHx of HTN presenting with new onset afib with RVR    #New onset Atrial fibrillation with RVR  - now s/p cardizem IVP 20 x1, 10 x 1, cardizem 30mg po x 1 and lopressor 5mg IVP x 1 in ED, improved to HR of 81 on repeat EKG in ED  - Overnight tele reports HRs in 130's-150's, now currently in low 100's  - Patient is not complaining of any anginal symptoms or palpitations at this time.  - No clear evidence of acute ischemia, trops negative x 2.  - IV ibutilide for conversion, if does not convert after 2 doses consider cardioversion while in echo   - Chadvasc score 1, continue full dose lovenox,  - Hold cardizem for now, send home with toprol 25 mg qd    #HTN  - BP soft 90's-100's s/p lopressor and cardizem  - Continue to hold lisinopril   - Continue to monitor routine hemodynamics    - Pt appears clinically euvolemic  - Monitor and replete lytes, keep K>4, Mg>2.  - Other cardiovascular workup will depend on clinical course.  - All other workup per primary team.  - Will continue to follow.             61 yo M with PMHx of HTN presenting with new onset afib with RVR    #New onset Atrial fibrillation with RVR  - now s/p cardizem IVP 20 x1, 10 x 1, cardizem 30mg po x 1 and lopressor 5mg IVP x 1 in ED, improved to HR of 81 on repeat EKG in ED  - Overnight tele reports HRs in 130's-150's, now currently in low 100's  - Patient is not complaining of any anginal symptoms or palpitations at this time.  - No clear evidence of acute ischemia, trops negative x 2.  - IV ibutilide for conversion, if does not convert after 2 doses consider cardioversion while in echo   - Chadvasc score 1, continue full dose lovenox during hospitalization  - Patient will continue AC with Eliquis for 30 days and follow up with Dr. Strickland in the office  - Hold Cardizem for now, will likely send home with toprol 25 mg qd    #HTN  - BP soft 90's-100's s/p lopressor and cardizem  - Continue to hold lisinopril   - Hold Cardizem while converting with ibutilide  - Continue to monitor routine hemodynamics  - Plan to discharge on metoprolol if maintains sinus    - Pt appears clinically euvolemic  - Monitor and replete lytes, keep K>4, Mg>2.  - Other cardiovascular workup will depend on clinical course.  - All other workup per primary team.  - Will continue to follow.

## 2020-09-23 NOTE — PHARMACOTHERAPY INTERVENTION NOTE - COMMENTS
Prescriptions filled at [facility].  Prescriptions: Eliquis 5mg/ toprol Xl  Brought to bedside and counseled.  [facility] name: Saravanan Rodriguez  Person(s) counseled (translation used?,family member called? if relevant): patient at bedside  Counseling materials provided/counseling aids used: Carenotes provided to patient on afib, eliquis. Answered all questions  Time spent Counseling: 15 minutes  Counseling provided according to [place] guidelines including side effects

## 2020-09-23 NOTE — ED ADULT NURSE REASSESSMENT NOTE - NS ED NURSE REASSESS COMMENT FT1
pt stable with vital signs stable no c/o voiced admitted to tele awaiting covid result and bed assignment
pt with no c/o ofpain voiced at present pt admitted to tele awaiting disposition  and no disconfort voiced

## 2020-09-23 NOTE — DISCHARGE NOTE PROVIDER - CARE PROVIDERS DIRECT ADDRESSES
[email or web address]

## 2020-10-01 ENCOUNTER — APPOINTMENT (OUTPATIENT)
Dept: CARDIOLOGY | Facility: CLINIC | Age: 60
End: 2020-10-01
Payer: COMMERCIAL

## 2020-10-01 ENCOUNTER — NON-APPOINTMENT (OUTPATIENT)
Age: 60
End: 2020-10-01

## 2020-10-01 VITALS
HEART RATE: 65 BPM | SYSTOLIC BLOOD PRESSURE: 133 MMHG | HEIGHT: 70 IN | DIASTOLIC BLOOD PRESSURE: 75 MMHG | BODY MASS INDEX: 28.49 KG/M2 | WEIGHT: 199 LBS | OXYGEN SATURATION: 96 %

## 2020-10-01 DIAGNOSIS — I10 ESSENTIAL (PRIMARY) HYPERTENSION: ICD-10-CM

## 2020-10-01 PROCEDURE — 93000 ELECTROCARDIOGRAM COMPLETE: CPT

## 2020-10-01 PROCEDURE — 99215 OFFICE O/P EST HI 40 MIN: CPT

## 2020-10-01 RX ORDER — LISINOPRIL 30 MG/1
30 TABLET ORAL
Qty: 90 | Refills: 3 | Status: ACTIVE | COMMUNITY
Start: 2020-10-01

## 2020-10-01 RX ORDER — APIXABAN 5 MG/1
5 TABLET, FILM COATED ORAL
Qty: 30 | Refills: 3 | Status: ACTIVE | COMMUNITY
Start: 2020-10-01

## 2020-10-07 PROBLEM — I10 ESSENTIAL (PRIMARY) HYPERTENSION: Chronic | Status: ACTIVE | Noted: 2020-09-22

## 2020-10-08 ENCOUNTER — APPOINTMENT (OUTPATIENT)
Dept: CARDIOLOGY | Facility: CLINIC | Age: 60
End: 2020-10-08
Payer: COMMERCIAL

## 2020-10-08 PROCEDURE — 93268 ECG RECORD/REVIEW: CPT

## 2020-10-20 ENCOUNTER — NON-APPOINTMENT (OUTPATIENT)
Age: 60
End: 2020-10-20

## 2020-10-20 ENCOUNTER — APPOINTMENT (OUTPATIENT)
Dept: CARDIOLOGY | Facility: CLINIC | Age: 60
End: 2020-10-20
Payer: COMMERCIAL

## 2020-10-20 VITALS
BODY MASS INDEX: 28.92 KG/M2 | OXYGEN SATURATION: 97 % | SYSTOLIC BLOOD PRESSURE: 126 MMHG | HEART RATE: 74 BPM | DIASTOLIC BLOOD PRESSURE: 82 MMHG | WEIGHT: 202 LBS | HEIGHT: 70 IN

## 2020-10-20 DIAGNOSIS — I48.0 PAROXYSMAL ATRIAL FIBRILLATION: ICD-10-CM

## 2020-10-20 PROCEDURE — 99072 ADDL SUPL MATRL&STAF TM PHE: CPT

## 2020-10-20 PROCEDURE — 93000 ELECTROCARDIOGRAM COMPLETE: CPT

## 2020-10-20 PROCEDURE — 99215 OFFICE O/P EST HI 40 MIN: CPT

## 2020-10-20 RX ORDER — CHLORZOXAZONE 250 MG/1
250 TABLET ORAL
Refills: 0 | Status: ACTIVE | COMMUNITY
Start: 2020-10-20

## 2020-10-20 RX ORDER — DICLOFENAC POTASSIUM 50 MG/1
50 TABLET, COATED ORAL
Refills: 0 | Status: ACTIVE | COMMUNITY
Start: 2020-10-20

## 2020-10-20 RX ORDER — NAPROXEN 500 MG/1
500 TABLET ORAL
Refills: 0 | Status: ACTIVE | COMMUNITY
Start: 2020-10-20

## 2020-10-20 RX ORDER — TRAMADOL HYDROCHLORIDE 50 MG/1
50 TABLET, COATED ORAL
Refills: 0 | Status: ACTIVE | COMMUNITY
Start: 2020-10-20

## 2020-11-03 ENCOUNTER — APPOINTMENT (OUTPATIENT)
Dept: CARDIOLOGY | Facility: CLINIC | Age: 60
End: 2020-11-03
Payer: COMMERCIAL

## 2020-11-03 DIAGNOSIS — R94.39 ABNORMAL RESULT OF OTHER CARDIOVASCULAR FUNCTION STUDY: ICD-10-CM

## 2020-11-03 PROCEDURE — 99072 ADDL SUPL MATRL&STAF TM PHE: CPT

## 2020-11-03 PROCEDURE — 93015 CV STRESS TEST SUPVJ I&R: CPT

## 2020-11-09 PROBLEM — R94.39 ABNORMAL STRESS ECG: Status: ACTIVE | Noted: 2020-11-09

## 2020-11-13 ENCOUNTER — APPOINTMENT (OUTPATIENT)
Dept: CARDIOLOGY | Facility: CLINIC | Age: 60
End: 2020-11-13
Payer: COMMERCIAL

## 2020-11-13 PROCEDURE — A9500: CPT

## 2020-11-13 PROCEDURE — 93015 CV STRESS TEST SUPVJ I&R: CPT

## 2020-11-13 PROCEDURE — 78452 HT MUSCLE IMAGE SPECT MULT: CPT

## 2021-02-02 ENCOUNTER — RX RENEWAL (OUTPATIENT)
Age: 61
End: 2021-02-02

## 2021-02-02 RX ORDER — METOPROLOL SUCCINATE 50 MG/1
50 TABLET, EXTENDED RELEASE ORAL DAILY
Qty: 90 | Refills: 3 | Status: ACTIVE | COMMUNITY
Start: 2020-10-01 | End: 1900-01-01

## 2021-10-06 PROBLEM — I10 ESSENTIAL HYPERTENSION: Status: ACTIVE | Noted: 2020-10-01

## 2024-07-26 NOTE — ED ADULT TRIAGE NOTE - HEART RATE (BEATS/MIN)
84
FAMILY HISTORY:  Child  Still living? Yes, Estimated age: Age Unknown  FH: hypertension, Age at diagnosis: Age Unknown

## 2024-11-29 NOTE — PATIENT PROFILE ADULT - FALL HARM RISK CONCLUSION
Please review. Protocol Failed; No Protocol    Medication(s) to Refill:   Requested Prescriptions     Pending Prescriptions Disp Refills    VITAMIN D3 1.25 MG (88657 UT) Oral Cap [Pharmacy Med Name: VITAMIN D3 50,000 IU (RESHMA) CAP] 12 capsule 4     Sig: TAKE 1 CAPSULE BY MOUTH 1 TIME A WEEK         Reason for Medication Refill being sent to Provider / Reason Protocol Failed:  [x] Non-Protocol Medication        Recent Labs:  Lab Results   Component Value Date    VITD 47.0 09/30/2024              Requested Prescriptions   Pending Prescriptions Disp Refills    VITAMIN D3 1.25 MG (61426 UT) Oral Cap [Pharmacy Med Name: VITAMIN D3 50,000 IU (RESHMA) CAP] 12 capsule 4     Sig: TAKE 1 CAPSULE BY MOUTH 1 TIME A WEEK       There is no refill protocol information for this order              Recent Outpatient Visits              2 months ago Controlled type 2 diabetes mellitus without complication, without long-term current use of insulin ([facility]    [facility]    3 months ago Type 2 diabetes mellitus without complication, without long-term current use of insulin ([facility]    [facility] Visit    1 year ago Hyperlipidemia, unspecified hyperlipidemia type    [facility]    [facility]    2 years ago Mastalgia    [facility] [place], Minhxuyen, PA-C    Office Visit    2 years ago Type 2 diabetes mellitus without complication, without long-term current use of insulin ([facility]    [facility] Visit          
Fall with Harm Risk